# Patient Record
Sex: MALE | URBAN - METROPOLITAN AREA
[De-identification: names, ages, dates, MRNs, and addresses within clinical notes are randomized per-mention and may not be internally consistent; named-entity substitution may affect disease eponyms.]

---
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
## 2024-06-25 NOTE — PROCEDURE: IN-HOUSE DISPENSING PHARMACY
Requested medication(s) are due for refill today: Yes  Patient has already received a courtesy refill: No  Other reason request has been forwarded to provider:   
Product 8 Amount/Unit (Numbers Only): 0
Name Of Product 3: Rosacea Gel with Brimonidine (metronidazole/ivermectin/azelaic acid/brimonidine)
Product 27 Unit Type: mg
Render Product Pricing In Note: Yes
Product 1 Application Directions: apply to lips twice a day as needed
Product 5 Refills: 1
Product 6 Amount/Unit (Numbers Only): 30
Product 1 Unit Type: tube(s)
Name Of Product 5: Rosacea Combo (metronidazole/ivermectin)
Product 5 Application Directions: apply to affected area(s) daily
Product 2 Application Directions: apply to affected area(s) for 3 months on, 1 month off, repeat as needed
Product 6 Unit Type: grams
Product 6 Application Directions: Apply to affected area at bedtime
Name Of Product 4: Tacrolimus 0.1% Ointment
Product 4 Application Directions: apply to affected area(s) once to twice daily as needed
Product 3 Refills: 2
Detail Level: Zone
Name Of Product 6: Wart Compound
Name Of Product 1: Dr Sivakumar Ward
Product 4 Refills: 11
Product 3 Application Directions: apply to affected areas every morning
Name Of Product 2: Melasma Emulsion (hydroquinone/tretinoin/kojic acid)